# Patient Record
Sex: FEMALE | Race: OTHER | NOT HISPANIC OR LATINO
[De-identification: names, ages, dates, MRNs, and addresses within clinical notes are randomized per-mention and may not be internally consistent; named-entity substitution may affect disease eponyms.]

---

## 2020-08-05 ENCOUNTER — TRANSCRIPTION ENCOUNTER (OUTPATIENT)
Age: 20
End: 2020-08-05

## 2020-12-13 DIAGNOSIS — Z01.818 ENCOUNTER FOR OTHER PREPROCEDURAL EXAMINATION: ICD-10-CM

## 2020-12-14 ENCOUNTER — APPOINTMENT (OUTPATIENT)
Dept: DISASTER EMERGENCY | Facility: CLINIC | Age: 20
End: 2020-12-14

## 2020-12-15 ENCOUNTER — OUTPATIENT (OUTPATIENT)
Dept: OUTPATIENT SERVICES | Facility: HOSPITAL | Age: 20
LOS: 1 days | End: 2020-12-15
Payer: COMMERCIAL

## 2020-12-15 VITALS
WEIGHT: 186.07 LBS | OXYGEN SATURATION: 100 % | SYSTOLIC BLOOD PRESSURE: 119 MMHG | DIASTOLIC BLOOD PRESSURE: 75 MMHG | RESPIRATION RATE: 15 BRPM | TEMPERATURE: 97 F | HEART RATE: 88 BPM | HEIGHT: 66 IN

## 2020-12-15 DIAGNOSIS — Z01.818 ENCOUNTER FOR OTHER PREPROCEDURAL EXAMINATION: ICD-10-CM

## 2020-12-15 DIAGNOSIS — N62 HYPERTROPHY OF BREAST: ICD-10-CM

## 2020-12-15 DIAGNOSIS — M54.2 CERVICALGIA: ICD-10-CM

## 2020-12-15 LAB
HCG SERPL-ACNC: <1 MIU/ML — SIGNIFICANT CHANGE UP
HCT VFR BLD CALC: 43.4 % — SIGNIFICANT CHANGE UP (ref 34.5–45)
HGB BLD-MCNC: 14.4 G/DL — SIGNIFICANT CHANGE UP (ref 11.5–15.5)
MCHC RBC-ENTMCNC: 30 PG — SIGNIFICANT CHANGE UP (ref 27–34)
MCHC RBC-ENTMCNC: 33.2 GM/DL — SIGNIFICANT CHANGE UP (ref 32–36)
MCV RBC AUTO: 90.4 FL — SIGNIFICANT CHANGE UP (ref 80–100)
NRBC # BLD: 0 /100 WBCS — SIGNIFICANT CHANGE UP (ref 0–0)
PLATELET # BLD AUTO: 291 K/UL — SIGNIFICANT CHANGE UP (ref 150–400)
RBC # BLD: 4.8 M/UL — SIGNIFICANT CHANGE UP (ref 3.8–5.2)
RBC # FLD: 11.3 % — SIGNIFICANT CHANGE UP (ref 10.3–14.5)
WBC # BLD: 6.41 K/UL — SIGNIFICANT CHANGE UP (ref 3.8–10.5)
WBC # FLD AUTO: 6.41 K/UL — SIGNIFICANT CHANGE UP (ref 3.8–10.5)

## 2020-12-15 PROCEDURE — G0463: CPT

## 2020-12-15 PROCEDURE — 84702 CHORIONIC GONADOTROPIN TEST: CPT

## 2020-12-15 PROCEDURE — 36415 COLL VENOUS BLD VENIPUNCTURE: CPT

## 2020-12-15 PROCEDURE — 85027 COMPLETE CBC AUTOMATED: CPT

## 2020-12-15 RX ORDER — SODIUM CHLORIDE 9 MG/ML
1000 INJECTION, SOLUTION INTRAVENOUS
Refills: 0 | Status: DISCONTINUED | OUTPATIENT
Start: 2020-12-15 | End: 2020-12-30

## 2020-12-15 NOTE — H&P PST ADULT - NSANTHOSAYNRD_GEN_A_CORE
No. CHILO screening performed.  STOP BANG Legend: 0-2 = LOW Risk; 3-4 = INTERMEDIATE Risk; 5-8 = HIGH Risk

## 2020-12-16 ENCOUNTER — TRANSCRIPTION ENCOUNTER (OUTPATIENT)
Age: 20
End: 2020-12-16

## 2020-12-17 ENCOUNTER — OUTPATIENT (OUTPATIENT)
Dept: OUTPATIENT SERVICES | Facility: HOSPITAL | Age: 20
LOS: 1 days | Discharge: HOME | End: 2020-12-17
Payer: COMMERCIAL

## 2020-12-17 ENCOUNTER — RESULT REVIEW (OUTPATIENT)
Age: 20
End: 2020-12-17

## 2020-12-17 VITALS
RESPIRATION RATE: 16 BRPM | SYSTOLIC BLOOD PRESSURE: 104 MMHG | TEMPERATURE: 98 F | OXYGEN SATURATION: 96 % | HEART RATE: 80 BPM | DIASTOLIC BLOOD PRESSURE: 66 MMHG

## 2020-12-17 VITALS
SYSTOLIC BLOOD PRESSURE: 111 MMHG | TEMPERATURE: 98 F | WEIGHT: 186.07 LBS | OXYGEN SATURATION: 96 % | HEART RATE: 90 BPM | RESPIRATION RATE: 16 BRPM | HEIGHT: 66 IN | DIASTOLIC BLOOD PRESSURE: 83 MMHG

## 2020-12-17 DIAGNOSIS — N62 HYPERTROPHY OF BREAST: ICD-10-CM

## 2020-12-17 DIAGNOSIS — M54.2 CERVICALGIA: ICD-10-CM

## 2020-12-17 PROCEDURE — 88305 TISSUE EXAM BY PATHOLOGIST: CPT | Mod: 26

## 2020-12-17 PROCEDURE — 19318 BREAST REDUCTION: CPT | Mod: 50

## 2020-12-17 PROCEDURE — 19318 BREAST REDUCTION: CPT

## 2020-12-17 PROCEDURE — 88305 TISSUE EXAM BY PATHOLOGIST: CPT

## 2020-12-17 RX ORDER — SODIUM CHLORIDE 9 MG/ML
1000 INJECTION, SOLUTION INTRAVENOUS
Refills: 0 | Status: DISCONTINUED | OUTPATIENT
Start: 2020-12-17 | End: 2020-12-17

## 2020-12-17 RX ORDER — ONDANSETRON 8 MG/1
4 TABLET, FILM COATED ORAL ONCE
Refills: 0 | Status: DISCONTINUED | OUTPATIENT
Start: 2020-12-17 | End: 2020-12-17

## 2020-12-17 RX ORDER — DESOGESTREL AND ETHINYL ESTRADIOL 0.15-0.03
1 KIT ORAL
Qty: 0 | Refills: 0 | DISCHARGE

## 2020-12-17 RX ORDER — OXYCODONE HYDROCHLORIDE 5 MG/1
5 TABLET ORAL ONCE
Refills: 0 | Status: DISCONTINUED | OUTPATIENT
Start: 2020-12-17 | End: 2020-12-17

## 2020-12-17 RX ORDER — HYDROMORPHONE HYDROCHLORIDE 2 MG/ML
0.5 INJECTION INTRAMUSCULAR; INTRAVENOUS; SUBCUTANEOUS
Refills: 0 | Status: DISCONTINUED | OUTPATIENT
Start: 2020-12-17 | End: 2020-12-17

## 2020-12-17 RX ADMIN — OXYCODONE HYDROCHLORIDE 5 MILLIGRAM(S): 5 TABLET ORAL at 15:15

## 2020-12-17 RX ADMIN — SODIUM CHLORIDE 50 MILLILITER(S): 9 INJECTION, SOLUTION INTRAVENOUS at 06:59

## 2020-12-17 RX ADMIN — OXYCODONE HYDROCHLORIDE 5 MILLIGRAM(S): 5 TABLET ORAL at 14:43

## 2020-12-17 NOTE — ASU DISCHARGE PLAN (ADULT/PEDIATRIC) - CALL YOUR DOCTOR IF YOU HAVE ANY OF THE FOLLOWING:
Bleeding that does not stop/Pain not relieved by Medications/Wound/Surgical Site with redness, or foul smelling discharge or pus

## 2020-12-17 NOTE — ASU DISCHARGE PLAN (ADULT/PEDIATRIC) - CARE PROVIDER_API CALL
Amilcar Dumas)  Plastic Surgery  [address] [facility] [address], [address]  [place] [address]  Phone: [phone]  Fax: [phone]  Scheduled Appointment: 12/18/2020

## 2020-12-17 NOTE — ASU DISCHARGE PLAN (ADULT/PEDIATRIC) - ASU DC SPECIAL INSTRUCTIONSFT
Keep dressings dry and intact until office visit with Dr MARTINEZ tomorrow.   Drink plenty of fluids   elevate head of bed   sleep on back   Take pain medications as directed by Dr Piter Lees and Motrin as directed

## 2020-12-21 LAB — SURGICAL PATHOLOGY STUDY: SIGNIFICANT CHANGE UP

## 2021-07-12 ENCOUNTER — TRANSCRIPTION ENCOUNTER (OUTPATIENT)
Age: 21
End: 2021-07-12

## 2021-07-21 PROBLEM — M54.2 CERVICALGIA: Chronic | Status: ACTIVE | Noted: 2020-12-15

## 2021-08-12 ENCOUNTER — APPOINTMENT (OUTPATIENT)
Age: 21
End: 2021-08-12

## 2022-03-04 NOTE — ASU PREOP CHECKLIST - PATIENT'S PERSONAL PROPERTY GIVEN TO
Medication is being filled for 1 time refill only due to:  Patient needs to be seen because due for DM recheck. Return in about 3 months (around 3/6/2022) for diabetes recheck.             Nay George RN       on unit

## 2022-08-22 ENCOUNTER — APPOINTMENT (OUTPATIENT)
Dept: ORTHOPEDIC SURGERY | Facility: CLINIC | Age: 22
End: 2022-08-22

## 2024-12-15 NOTE — ASU PREOP CHECKLIST - SPO2 (%)
AM Hospitalist PN  Patient seen and examined.   Feeling bloated but otherwise doing okay.  No pain.  Diarrhea frequency improving     Last Recorded Vitals  Visit Vitals  /69 (BP Location: LUE - Left upper extremity, Patient Position: Semi-Parra's)   Pulse 92   Temp 97.3 °F (36.3 °C) (Temporal)   Resp 16   Ht 6' (1.829 m)   Wt 76.2 kg (167 lb 15.9 oz)   SpO2 96%   BMI 22.78 kg/m²     PE:   General: No acute distress  Head:  Normocephalic  Eyes: Pupils are reactive and bilaterally symmetric.  No scleral icterus was seen.  EOMI     Mouth:  Oral mucosa is moist.  There were no oral ulcers or pharyngeal exudates seen.  Neck: Supple.  No increased jugular venous distention.  No cervical or supraclavicular lymphadenopathy was noted.  Good carotid upstroke    Cardiac: Heart is regular rate and rhythm without murmur  Lungs: Lungs are clear to auscultation bilaterally.  Abdomen: Abdomen distended  Musculoskeletal: No calf tenderness bilaterally.  Negative Homans sign.    Neuro: + Tremors in outstretched hands.  Moves all extremities.   Vasc: DP/PT pulses 2/4 bilaterally.  Capillary refill is less than 2 seconds in the toes.  Psych: Normal affect  Skin: No pathologic skin changes were seen     Imaging    MRI MRCP AND ABDOMEN W WO CONTRAST   Final Result   There is wall thickening with fluid surrounding the   gallbladder. Nonspecific. Most recent HIDA scan of 12/11/2024 showed no   findings suspicious for cystic or common duct obstruction.      Common bile duct is normal in size, no persistent defect suspicious for a   stone.      Hepatosplenomegaly with ascites and pleural effusions.      Complex cyst in the upper pole of the right kidney. Proteinaceous,   hemorrhagic. No enhancement. At the minimum periodic follow-up ultrasound   for continued stability. Nonenhancing cyst in the upper pole of the left   kidney.                  Electronically Signed by: KAYLEE RODRIGUEZ  M.D.    Signed on: 12/15/2024 8:34 AM    Workstation ID: [id number]      NM HEPATOBILIARY W PHARM W QUANT   Final Result   Normal gallbladder ejection fraction. No cystic or common duct obstruction.      .         Electronically Signed by: KAYLEE RODRIGUEZ M.D.    Signed on: 12/11/2024 2:08 PM    Workstation ID: [id number]      US LIVER GALLBLADDER PANCREAS (RUQ)   Final Result   1.  Distended gallbladder with probable sludge.   2.  Gallbladder wall thickening may be due to acute or chronic   cholecystitis or related to liver disease or heart failure.  No   pericholecystic fluid is seen, and sonographic Ocampo sign is unreliable.    HIDA scan could be obtained for further assessment if clinically indicated.   3.  Hepatomegaly and hepatic steatosis       Electronically Signed by: SHEILA KENNEY M.D.    Signed on: 12/11/2024 1:26 AM    Workstation ID: [id number]      CT ABDOMEN PELVIS W CONTRAST - IV contrast only   Final Result   1.  Distended gallbladder with small amount of adjacent fat stranding.    This can be seen with acute cholecystitis and can be further assessed with   HIDA scan if clinically indicated.   2.  Hepatic steatosis and hepatomegaly.   3.  Small volume of fluid adjacent to the liver, at the appendiceal tip,   and in the pelvis without drainable abscess or free air.   4.  Mild bladder wall thickening can be seen with cystitis and correlation   with urinalysis is recommended.   5.  Bilateral perinephric fat stranding can be seen in asymptomatic   patients and in those with renal failure or ascending urinary tract   infection.   6.  A hypoattenuating subcentimeter lesion at the left renal upper pole is   too small to characterize but may be a cyst, and there is exophytic lesion   anteriorly from the right upper pole which is not a simple cyst and could   be proteinaceous/hemorrhagic cyst or possibly solid tumor.   7.  A few prominent abdominal lymph nodes are probably reactive.     8.  Please  see above for additional observations.      Electronically Signed by: SHEILA KENNEY M.D.    Signed on: 12/10/2024 9:46 PM    Workstation ID: [id number]      XR CHEST PA OR AP 1 VIEW   Final Result   No lung opacity concerning for consolidation.      Electronically Signed by: SHEILA KENNEY M.D.    Signed on: 12/10/2024 6:49 PM    Workstation ID: [id number]      Esophagogastroduodenoscopy (EGD)    (Results Pending)       Labs     Recent Labs   Lab 12/15/24  0653   WBC 11.1*   RBC 2.59*   HGB 8.6*   HCT 24.5*   *   MCV 94.6     Recent Labs   Lab 12/15/24  0653 12/14/24  1819 12/14/24  0932 12/14/24  0707 12/13/24  0659 12/12/24  2033 12/11/24  0507 12/10/24  1853   SODIUM 126* 126* 127* 126* 131* 129*   < > 129*   POTASSIUM 4.5 4.0  --  3.9 4.2 3.9   < > 2.4*   CHLORIDE 101 98  --  100 103 100   < > 92*   CO2 20* 22  --  19* 23 23   < > 28   BUN 10 11  --  11 12 13   < > 25*   CREATININE 0.58* 0.68  --  0.63* 0.79 0.74   < > 0.88   GLUCOSE 94 111*  --  98 99 117*   < > 120*   ANIONGAP 10 10  --  11 9 10   < > 11   *  --   --  127* 108* 128*   < > 125*   GPT 43  --   --  43 43 47   < > 50   ALKPT 228*  --   --  195* 161* 162*   < > 158*   CALCIUM 6.6* 6.4*  --  6.5* 6.6* 6.5*   < > 7.6*   ALBUMIN 1.5*  --   --  1.4* 1.5* 1.6*   < > 2.1*   BILIRUBIN 2.3*  --   --  2.7* 2.7* 2.6*   < > 4.2*   INR  --   --   --   --  1.5 1.5  --  1.5   LIPA  --   --   --   --   --   --   --  26    < > = values in this interval not displayed.       ASSESSMENT AND PLAN:  Alcohol withdrawal  Tactile hallucinations  BAL 0 mg/dL on admit.  Last drink about 2 days ago.  He typically will drink vodka  CIWA protocol, MVI, thiamine, folic acid  Chemical dependency counselor to see      Abdominal pain   Diarrhea secondary to C. difficile colitis  -lipase normal.   -concern for acute cholecystitis. HIDA scan negative for acute cholecystitis.   -zosyn stopped.   -P.o. vancomycin along with IV Flagyl started in light of  worsening abdominal pain  and elevated lactate. Lactate normal improved with IV fluid hydration.  IV Flagyl stopped.  Diarrhea frequency improving    Sepsis secondary to C. difficile colitis  Hypotension  -IV fluid bolus. Midodrine started initially in light of concern for sepsis secondary to acute cholecystitis. HIDA scan negative. Zosyn stopped.   -Infectious workup pending.  Blood cultures remain negative.    -Continue p.o. vancomycin.  IV Flagyl now stopped by ID.    Increased weight loss  Decreased appetite  Hematochezia  -After discussing further with ID service they feel there might be underlying GI malignancy.  They recommend GI consult for further evaluation  -CEA and CA 19-9 normal range  -Plan for EGD today    Elevated liver enzymes  AST > ALT and possibly secondary to alcohol abuse    Fatty liver  Alcohol cessation may be beneficial  Needs outpatient follow-up    Marijuana use  Frequent smoker of marijuana and may be contributing to tactile hallucinations    Nicotine dependence  Counseled on quitting for > 3 minutes  Nicotine patch and request    Hypomagnesemia/hypokalemia/phosphatemia/hyponatremia  Hypomagnesemia and hypokalemia possibly contributed to alcohol abuse and poor p.o. intake  Hyponatremia is likely secondary to hypovolemia   K and mag replaced in ED  BMP in a.m.  Replace phosphorus per protocol  Urine Na < 12 and urine osm low at 192. May be from low solute intake and relative high fluid intake.  IV fluids continued as mentioned above    Dispo/DVT  Full code  Lovenox  Pending clinical course         96

## 2025-07-09 ENCOUNTER — EMERGENCY (EMERGENCY)
Facility: HOSPITAL | Age: 25
LOS: 1 days | End: 2025-07-09
Attending: STUDENT IN AN ORGANIZED HEALTH CARE EDUCATION/TRAINING PROGRAM | Admitting: STUDENT IN AN ORGANIZED HEALTH CARE EDUCATION/TRAINING PROGRAM
Payer: SELF-PAY

## 2025-07-09 VITALS
DIASTOLIC BLOOD PRESSURE: 68 MMHG | RESPIRATION RATE: 18 BRPM | OXYGEN SATURATION: 97 % | HEART RATE: 88 BPM | SYSTOLIC BLOOD PRESSURE: 98 MMHG | TEMPERATURE: 97 F | WEIGHT: 145.06 LBS

## 2025-07-09 VITALS
HEART RATE: 82 BPM | OXYGEN SATURATION: 99 % | RESPIRATION RATE: 18 BRPM | DIASTOLIC BLOOD PRESSURE: 70 MMHG | SYSTOLIC BLOOD PRESSURE: 112 MMHG | TEMPERATURE: 98 F

## 2025-07-09 DIAGNOSIS — S80.01XA CONTUSION OF RIGHT KNEE, INITIAL ENCOUNTER: ICD-10-CM

## 2025-07-09 LAB
ALBUMIN SERPL ELPH-MCNC: 4.4 G/DL — SIGNIFICANT CHANGE UP (ref 3.4–5)
ALP SERPL-CCNC: 41 U/L — SIGNIFICANT CHANGE UP (ref 40–120)
ALT FLD-CCNC: 30 U/L — SIGNIFICANT CHANGE UP (ref 12–42)
ANION GAP SERPL CALC-SCNC: 15 MMOL/L — SIGNIFICANT CHANGE UP (ref 9–16)
APTT BLD: 32.4 SEC — SIGNIFICANT CHANGE UP (ref 26.1–36.8)
AST SERPL-CCNC: 33 U/L — SIGNIFICANT CHANGE UP (ref 15–37)
BASOPHILS # BLD AUTO: 0.05 K/UL — SIGNIFICANT CHANGE UP (ref 0–0.2)
BASOPHILS NFR BLD AUTO: 0.7 % — SIGNIFICANT CHANGE UP (ref 0–2)
BILIRUB SERPL-MCNC: 0.6 MG/DL — SIGNIFICANT CHANGE UP (ref 0.2–1.2)
BUN SERPL-MCNC: 10 MG/DL — SIGNIFICANT CHANGE UP (ref 7–23)
CALCIUM SERPL-MCNC: 9.1 MG/DL — SIGNIFICANT CHANGE UP (ref 8.5–10.5)
CHLORIDE SERPL-SCNC: 107 MMOL/L — SIGNIFICANT CHANGE UP (ref 96–108)
CO2 SERPL-SCNC: 26 MMOL/L — SIGNIFICANT CHANGE UP (ref 22–31)
CREAT SERPL-MCNC: 0.78 MG/DL — SIGNIFICANT CHANGE UP (ref 0.5–1.3)
EGFR: 107 ML/MIN/1.73M2 — SIGNIFICANT CHANGE UP
EGFR: 107 ML/MIN/1.73M2 — SIGNIFICANT CHANGE UP
EOSINOPHIL # BLD AUTO: 0.09 K/UL — SIGNIFICANT CHANGE UP (ref 0–0.5)
EOSINOPHIL NFR BLD AUTO: 1.2 % — SIGNIFICANT CHANGE UP (ref 0–6)
ETHANOL SERPL-MCNC: 314 MG/DL — HIGH
GLUCOSE SERPL-MCNC: 103 MG/DL — HIGH (ref 70–99)
HCG SERPL-ACNC: <1 MIU/ML — SIGNIFICANT CHANGE UP
HCT VFR BLD CALC: 42.4 % — SIGNIFICANT CHANGE UP (ref 34.5–45)
HGB BLD-MCNC: 14 G/DL — SIGNIFICANT CHANGE UP (ref 11.5–15.5)
IMM GRANULOCYTES # BLD AUTO: 0.03 K/UL — SIGNIFICANT CHANGE UP (ref 0–0.07)
IMM GRANULOCYTES NFR BLD AUTO: 0.4 % — SIGNIFICANT CHANGE UP (ref 0–0.9)
INR BLD: 1.14 — SIGNIFICANT CHANGE UP (ref 0.85–1.16)
LIDOCAIN IGE QN: 33 U/L — SIGNIFICANT CHANGE UP (ref 16–77)
LYMPHOCYTES # BLD AUTO: 2.36 K/UL — SIGNIFICANT CHANGE UP (ref 1–3.3)
LYMPHOCYTES NFR BLD AUTO: 30.9 % — SIGNIFICANT CHANGE UP (ref 13–44)
MCHC RBC-ENTMCNC: 30.9 PG — SIGNIFICANT CHANGE UP (ref 27–34)
MCHC RBC-ENTMCNC: 33 G/DL — SIGNIFICANT CHANGE UP (ref 32–36)
MCV RBC AUTO: 93.6 FL — SIGNIFICANT CHANGE UP (ref 80–100)
MONOCYTES # BLD AUTO: 0.51 K/UL — SIGNIFICANT CHANGE UP (ref 0–0.9)
MONOCYTES NFR BLD AUTO: 6.7 % — SIGNIFICANT CHANGE UP (ref 2–14)
NEUTROPHILS # BLD AUTO: 4.59 K/UL — SIGNIFICANT CHANGE UP (ref 1.8–7.4)
NEUTROPHILS NFR BLD AUTO: 60.1 % — SIGNIFICANT CHANGE UP (ref 43–77)
NRBC # BLD AUTO: 0 K/UL — SIGNIFICANT CHANGE UP (ref 0–0)
NRBC # FLD: 0 K/UL — SIGNIFICANT CHANGE UP (ref 0–0)
NRBC BLD AUTO-RTO: 0 /100 WBCS — SIGNIFICANT CHANGE UP (ref 0–0)
PLATELET # BLD AUTO: 227 K/UL — SIGNIFICANT CHANGE UP (ref 150–400)
PMV BLD: 8.2 FL — SIGNIFICANT CHANGE UP (ref 7–13)
POTASSIUM SERPL-MCNC: 3.6 MMOL/L — SIGNIFICANT CHANGE UP (ref 3.5–5.3)
POTASSIUM SERPL-SCNC: 3.6 MMOL/L — SIGNIFICANT CHANGE UP (ref 3.5–5.3)
PROT SERPL-MCNC: 8 G/DL — SIGNIFICANT CHANGE UP (ref 6.4–8.2)
PROTHROM AB SERPL-ACNC: 13.2 SEC — SIGNIFICANT CHANGE UP (ref 9.9–13.4)
RBC # BLD: 4.53 M/UL — SIGNIFICANT CHANGE UP (ref 3.8–5.2)
RBC # FLD: 12.4 % — SIGNIFICANT CHANGE UP (ref 10.3–14.5)
SODIUM SERPL-SCNC: 148 MMOL/L — HIGH (ref 132–145)
WBC # BLD: 7.63 K/UL — SIGNIFICANT CHANGE UP (ref 3.8–10.5)
WBC # FLD AUTO: 7.63 K/UL — SIGNIFICANT CHANGE UP (ref 3.8–10.5)
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
## 2025-07-09 NOTE — ED ADULT NURSE NOTE - NS TRANSFER PATIENT BELONGINGS
Pt has red wallet,no cash, credit card, gold necklace, shani earrings, rings and bracelets.,/Cell Phone/PDA (specify)/Electronic Device (specify)/Jewelry/Money (specify)/Other belongings/Clothing

## 2025-07-09 NOTE — ED ADULT NURSE NOTE - OBJECTIVE STATEMENT
Pt admits to ETOH use and fall. Abrasions present to face and left knee, Breathing equal and unlabored, rise and fall of chest noted.

## 2025-07-09 NOTE — ED ADULT NURSE NOTE - NSFALLRISKINTERV_ED_ALL_ED
Assistance OOB with selected safe patient handling equipment if applicable/Assistance with ambulation/Communicate fall risk and risk factors to all staff, patient, and family/Monitor gait and stability/Monitor for mental status changes and reorient to person, place, and time, as needed/Provide visual cue: yellow wristband, yellow gown, etc/Reinforce activity limits and safety measures with patient and family/Toileting schedule using arm’s reach rule for commode and bathroom/Use of alarms - bed, stretcher, chair and/or video monitoring/Call bell, personal items and telephone in reach/Instruct patient to call for assistance before getting out of bed/chair/stretcher/Non-slip footwear applied when patient is off stretcher/[place] to call system/Physically safe environment - no spills, clutter or unnecessary equipment/Purposeful Proactive Rounding/Room/bathroom lighting operational, light cord in reach
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
## 2025-07-09 NOTE — ED PROVIDER NOTE - PHYSICAL EXAMINATION
CONSTITUTIONAL: Well developed; in no acute distress  HEAD: Normocephalic; abrasions on right side of the face  EYES: No conjunctival injection, no scleral icterus, NELIDA  ENT:No nasal discharge; airway clear.  NECK: Placed on C-Collar no midline tenderness or step off   CARD: Warm and well perfused, not tachycardic RRR  RESP: Breathing comfortably on RA, bilateral clear breathing sounds   Abdomen: Soft, non-tender, non-distended   EXT: No gross deformities, normal ROM. Abrasion on the right knee   SKIN: Warm and dry  NEURO: Drowsy, moves all extremities CONSTITUTIONAL: Well developed; in no acute distress  HEAD: Normocephalic; abrasions on right side of the face  EYES: No conjunctival injection, no scleral icterus, NELIDA  ENT:No nasal discharge; airway clear.  NECK: Placed on C-Collar no midline tenderness or step off   CARD: Warm and well perfused, not tachycardic RRR  RESP: Breathing comfortably on RA, bilateral clear breathing sounds   Abdomen: Soft, non-tender, non-distended   EXT: No gross deformities, normal ROM. Abrasion on the right knee   SKIN: Warm and dry  NEURO: Drowsy, moves all extremities  Exam chaperoned by PCA Elizabeth  No chest wall or abdominal ecchymosis no tenderness  No injuries in the back
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
## 2025-07-11 DIAGNOSIS — S00.81XA ABRASION OF OTHER PART OF HEAD, INITIAL ENCOUNTER: ICD-10-CM

## 2025-07-11 DIAGNOSIS — S12.9XXA FRACTURE OF NECK, UNSPECIFIED, INITIAL ENCOUNTER: ICD-10-CM

## 2025-07-11 DIAGNOSIS — Z23 ENCOUNTER FOR IMMUNIZATION: ICD-10-CM

## 2025-07-11 DIAGNOSIS — F10.129 ALCOHOL ABUSE WITH INTOXICATION, UNSPECIFIED: ICD-10-CM
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
